# Patient Record
Sex: MALE | Race: OTHER | HISPANIC OR LATINO | URBAN - METROPOLITAN AREA
[De-identification: names, ages, dates, MRNs, and addresses within clinical notes are randomized per-mention and may not be internally consistent; named-entity substitution may affect disease eponyms.]

---

## 2020-01-11 ENCOUNTER — EMERGENCY (EMERGENCY)
Facility: HOSPITAL | Age: 35
LOS: 1 days | Discharge: HOME | End: 2020-01-11
Attending: EMERGENCY MEDICINE
Payer: COMMERCIAL

## 2020-01-11 VITALS
TEMPERATURE: 100 F | HEIGHT: 67 IN | OXYGEN SATURATION: 97 % | SYSTOLIC BLOOD PRESSURE: 96 MMHG | HEART RATE: 116 BPM | DIASTOLIC BLOOD PRESSURE: 67 MMHG | WEIGHT: 179.9 LBS | RESPIRATION RATE: 19 BRPM

## 2020-01-11 PROCEDURE — 99284 EMERGENCY DEPT VISIT MOD MDM: CPT

## 2020-01-11 PROCEDURE — 71046 X-RAY EXAM CHEST 2 VIEWS: CPT | Mod: 26

## 2020-01-11 RX ORDER — SODIUM CHLORIDE 9 MG/ML
1000 INJECTION INTRAMUSCULAR; INTRAVENOUS; SUBCUTANEOUS ONCE
Refills: 0 | Status: COMPLETED | OUTPATIENT
Start: 2020-01-11 | End: 2020-01-11

## 2020-01-11 RX ORDER — GUAIFENESIN/DEXTROMETHORPHAN 600MG-30MG
10 TABLET, EXTENDED RELEASE 12 HR ORAL ONCE
Refills: 0 | Status: COMPLETED | OUTPATIENT
Start: 2020-01-11 | End: 2020-01-11

## 2020-01-11 RX ORDER — KETOROLAC TROMETHAMINE 30 MG/ML
15 SYRINGE (ML) INJECTION ONCE
Refills: 0 | Status: DISCONTINUED | OUTPATIENT
Start: 2020-01-11 | End: 2020-01-11

## 2020-01-11 RX ORDER — ALBUTEROL 90 UG/1
0 AEROSOL, METERED ORAL
Qty: 0 | Refills: 0 | DISCHARGE

## 2020-01-11 NOTE — ED ADULT NURSE NOTE - OBJECTIVE STATEMENT
pt c/o coughing //body aches x today. Hx of Asthma, took a lot of albuterol inhalation so pt states it might have caused his chest tightness and palpitations

## 2020-01-12 LAB
ANION GAP SERPL CALC-SCNC: 6 MMOL/L — SIGNIFICANT CHANGE UP (ref 5–17)
BUN SERPL-MCNC: 17 MG/DL — SIGNIFICANT CHANGE UP (ref 7–18)
CALCIUM SERPL-MCNC: 8.5 MG/DL — SIGNIFICANT CHANGE UP (ref 8.4–10.5)
CHLORIDE SERPL-SCNC: 103 MMOL/L — SIGNIFICANT CHANGE UP (ref 96–108)
CO2 SERPL-SCNC: 28 MMOL/L — SIGNIFICANT CHANGE UP (ref 22–31)
CREAT SERPL-MCNC: 1.03 MG/DL — SIGNIFICANT CHANGE UP (ref 0.5–1.3)
GLUCOSE SERPL-MCNC: 103 MG/DL — HIGH (ref 70–99)
HCT VFR BLD CALC: 47 % — SIGNIFICANT CHANGE UP (ref 39–50)
HGB BLD-MCNC: 15.3 G/DL — SIGNIFICANT CHANGE UP (ref 13–17)
MCHC RBC-ENTMCNC: 26.5 PG — LOW (ref 27–34)
MCHC RBC-ENTMCNC: 32.6 GM/DL — SIGNIFICANT CHANGE UP (ref 32–36)
MCV RBC AUTO: 81.3 FL — SIGNIFICANT CHANGE UP (ref 80–100)
NRBC # BLD: 0 /100 WBCS — SIGNIFICANT CHANGE UP (ref 0–0)
PLATELET # BLD AUTO: 213 K/UL — SIGNIFICANT CHANGE UP (ref 150–400)
POTASSIUM SERPL-MCNC: 4 MMOL/L — SIGNIFICANT CHANGE UP (ref 3.5–5.3)
POTASSIUM SERPL-SCNC: 4 MMOL/L — SIGNIFICANT CHANGE UP (ref 3.5–5.3)
RBC # BLD: 5.78 M/UL — SIGNIFICANT CHANGE UP (ref 4.2–5.8)
RBC # FLD: 15 % — HIGH (ref 10.3–14.5)
SODIUM SERPL-SCNC: 137 MMOL/L — SIGNIFICANT CHANGE UP (ref 135–145)
WBC # BLD: 8.55 K/UL — SIGNIFICANT CHANGE UP (ref 3.8–10.5)
WBC # FLD AUTO: 8.55 K/UL — SIGNIFICANT CHANGE UP (ref 3.8–10.5)

## 2020-01-12 PROCEDURE — 71046 X-RAY EXAM CHEST 2 VIEWS: CPT

## 2020-01-12 PROCEDURE — 80048 BASIC METABOLIC PNL TOTAL CA: CPT

## 2020-01-12 PROCEDURE — 96374 THER/PROPH/DIAG INJ IV PUSH: CPT

## 2020-01-12 PROCEDURE — 36415 COLL VENOUS BLD VENIPUNCTURE: CPT

## 2020-01-12 PROCEDURE — 85027 COMPLETE CBC AUTOMATED: CPT

## 2020-01-12 PROCEDURE — 93005 ELECTROCARDIOGRAM TRACING: CPT

## 2020-01-12 PROCEDURE — 99284 EMERGENCY DEPT VISIT MOD MDM: CPT | Mod: 25

## 2020-01-12 PROCEDURE — 94640 AIRWAY INHALATION TREATMENT: CPT

## 2020-01-12 RX ORDER — IPRATROPIUM/ALBUTEROL SULFATE 18-103MCG
3 AEROSOL WITH ADAPTER (GRAM) INHALATION ONCE
Refills: 0 | Status: COMPLETED | OUTPATIENT
Start: 2020-01-12 | End: 2020-01-12

## 2020-01-12 RX ADMIN — Medication 50 MILLIGRAM(S): at 01:39

## 2020-01-12 RX ADMIN — Medication 10 MILLILITER(S): at 00:35

## 2020-01-12 RX ADMIN — Medication 15 MILLIGRAM(S): at 00:35

## 2020-01-12 RX ADMIN — SODIUM CHLORIDE 1000 MILLILITER(S): 9 INJECTION INTRAMUSCULAR; INTRAVENOUS; SUBCUTANEOUS at 00:34

## 2020-01-12 RX ADMIN — Medication 3 MILLILITER(S): at 01:39

## 2020-01-12 NOTE — ED PROVIDER NOTE - PATIENT PORTAL LINK FT
You can access the FollowMyHealth Patient Portal offered by [facility] by registering at the following website: [email or web address]. By joining [facility]’s FollowMyHealth portal, you will also be able to view your health information using other applications (apps) compatible with our system.

## 2020-01-12 NOTE — ED PROVIDER NOTE - NSFOLLOWUPINSTRUCTIONS_ED_ALL_ED_FT
You were seen today for your shortness of breath. You have a respiratory infection secondary to a virus. You are also being given steroids for your asthma. Please continue to use your nebulizer every 6-8 hours as needed for your shortness of breath. Please follow up with your primary care doctor. Please return to the Emergency Department for worsening signs or symptoms.

## 2020-01-12 NOTE — ED PROVIDER NOTE - CLINICAL SUMMARY MEDICAL DECISION MAKING FREE TEXT BOX
33 yo M with URI symptoms. Diffuse wheezing on exam. Patient given steroids and nebs with improvement in symptoms. CXR without acute disease. Given steroid burst. Nontoxic and medically stable for discharge. Return precautions provided and patient understands to return to the ED for worsening signs and symptoms. Instructed to follow up with primary care physician and agreeable. Patient's questions answered and given copies of lab results.

## 2020-01-12 NOTE — ED PROVIDER NOTE - OBJECTIVE STATEMENT
34 year old male with PMHx of asthma and cat allergies presenting to the ED with complaints of coughing, congestion, and URI symptoms for the past 3-4 days. Patient states that he went to urgent care for his girlfriend who was diagnosed with influenza B and he now has developed a cough. Patient has been using albuterol with relief to shortness of breath. Patient with increased wheezing. Denies fevers, nausea, vomiting, chest pain, shortness of breath, abdominal pain, dysuria, hematuria, diarrhea, constipation, or any other acute complaints.

## 2020-01-12 NOTE — ED PROVIDER NOTE - NSFOLLOWUPCLINICS_GEN_ALL_ED_FT
[facility]  [facility]  [address] - [address] [place] [address]  Phone: [phone]  Fax: [phone]  Follow Up Time:

## 2020-01-12 NOTE — ED PROVIDER NOTE - CONSTITUTIONAL, MLM
normal... Well appearing, awake, alert, oriented to person, place, time/situation and in no apparent distress. Well appearing, awake, alert, oriented to person, place, time/situation

## 2020-01-12 NOTE — ED PROVIDER NOTE - CHPI ED SYMPTOMS NEG
no fever/no nausea, no vomiting, no chest pain, no shortness of breath, no abd pain, no dysuria, no hematuria, no diarrhea, no constipation

## 2020-02-05 NOTE — ED PROVIDER NOTE - NS_ATTENDINGSCRIBE_ED_ALL_ED
"Physical Therapy Evaluation completed.   Bed Mobility:  Supine to Sit: Modified Independent  Transfers: Sit to Stand: Contact Guard Assist(CGA 2' to concerns with RLE knee pain/possible buckling)  Gait: Level Of Assist: Minimal Assist (CGA to min A dependet on cueing and pain) with Front-Wheel Walker  Plan of Care: Will benefit from Physical Therapy 3 times per week  Discharge Recommendations: Equipment: Will Continue to Assess for Equipment Needs. See below    Pt presents to PT s/p fall with cervical fracture and what appears to be likely connective tissue injury at RLE knee. Fall appears related to syncopal episode rather than mechanical in nature. He was able to demonstrate sit<>stands with CGA and initiation of ambulatory activity with FWW with min A. He was limited 2' to pain at RLE knee and decreased ability/endurance to effectively compensate for deficits with increasing activity. Noted that pt reports this is first attempt at OOB activity as well. Anticipate that as RLE knee pain improves, he will progress well with increased OOB activity from both PT and staff. However in current condition would recommend continued skilled PT/placement prior to dc to home given current objective findings, age, I PLOF, current co-morbidities (including cervical fracture) and limited social supports to assist with current level needed. Anticipate pt would have difficulty managing self and IADls/ADLs and would defer to dc recs to OT as well (as pt is alone majority of day).    See \"Rehab Therapy-Acute\" Patient Summary Report for complete documentation.     " I personally performed the service described in the documentation recorded by the scribe in my presence, and it accurately and completely records my words and actions.

## 2021-08-03 NOTE — ED PROVIDER NOTE - TOBACCO USE
Spiritual Plan of Care    Pt Name: Manuel Oscar  Pt : 1933  Date: August 3, 2021    Visit Type: In person  Referral Source: Patient  Reason for Visit: Trigger, Spiritual Assessment  Length of Visit: 45 minutes  Spiritual Care Consult Needed: Spiritual Care eval completed  Spiritual Care Visit Preference:     Taxonomy:    · Intended Effects: Demonstrate caring and concern, Yusra Affirmation  · Methods: Assist with spiritual/Holiness practices  · Interventions: Prayer for healing, Provide sacred reading(s)    Patient Affect at Time of Visit: Alert, Cooperative, Expressive, Open to  Visit, Quiet, Tearful  Patient Assessment: Lonely, Relies on yusra, Hopeful, Anxious, Coping   Pt is hoping his legs will work better so he isn't a burden on others. Pt is proud of his service in the  as he served in the [employer] during peace time. Pt loss his wife sixteen years ago. From this marriage came two children a son who was lost when he drowned and a daughter Shivani. Pt has strong family support from pt's daughter Shivani who lives just down the road. Pt was a  which was a family business handed down from his Father. Pt loss his Father at [age] because of smoking and his Mother lived to be ninety [age].   Patient  Intervention: Affirmation, Clarify Feelings, Emotional Support, Exploration, Prayer, Scripture    Spiritual Plan of Care: No plan for follow up at this time  Patient Reported Outcome:  Coping techniques strengthened  [facility] Community:  [facility] [employer] in [place]     Unknown if ever smoked